# Patient Record
Sex: MALE | Race: WHITE | NOT HISPANIC OR LATINO | Employment: OTHER | URBAN - NONMETROPOLITAN AREA
[De-identification: names, ages, dates, MRNs, and addresses within clinical notes are randomized per-mention and may not be internally consistent; named-entity substitution may affect disease eponyms.]

---

## 2017-09-06 ENCOUNTER — OFFICE VISIT (OUTPATIENT)
Dept: SLEEP MEDICINE | Facility: HOSPITAL | Age: 62
End: 2017-09-06

## 2017-09-06 VITALS
SYSTOLIC BLOOD PRESSURE: 130 MMHG | DIASTOLIC BLOOD PRESSURE: 80 MMHG | BODY MASS INDEX: 35.79 KG/M2 | WEIGHT: 228 LBS | HEIGHT: 67 IN

## 2017-09-06 DIAGNOSIS — G25.81 RESTLESS LEGS SYNDROME (RLS): ICD-10-CM

## 2017-09-06 DIAGNOSIS — G47.33 OBSTRUCTIVE SLEEP APNEA, ADULT: Primary | ICD-10-CM

## 2017-09-06 DIAGNOSIS — F41.9 ANXIETY DISORDER, UNSPECIFIED TYPE: ICD-10-CM

## 2017-09-06 PROCEDURE — 99213 OFFICE O/P EST LOW 20 MIN: CPT | Performed by: INTERNAL MEDICINE

## 2017-09-06 NOTE — PROGRESS NOTES
Sleep Clinic Follow Up    Date: 9/6/2017  Primary Care Physician: Eugenia Naranjo DO      Interim History (1/3):  Since the last visit on 08/18/2016, patient has:      1)  LINDSEY - Has remained compliant with CPAP. He denies mask and machine issues, has occasional dry mouth, but denies headaches, pressures intolerance, or non-compliance. He denies abnormal dreams, sleep paralysis, hypnagogic hallucinations, or cataplexy symptoms.     PAP Data:  Time frame: 08/18/2016 - 09/05/2017   Compliance 96.6 %  PAP range : 8-15 cm H2O  Average 90% pressure: 8.5-14.5 cmH2O  Leak: 10 minutes   Average AHI 2.3 events/hr  Mask type: pillows  DME: [facility]    Bed time: 2100  Sleep latency: 5-20 minutes  Number of times awakens during the night: 0 but wife has tv on   Wake time: 0700  Estimated total sleep time at night: 10  Caffeine intake: 6 cups of coffee throughout the day  Alcohol intake: none  Nap time: 20 minutes at a time throughout the day  Sleepiness with Driving: no, but does little driving    2) RLS - on Requip 2mg nightly. He feels no symptoms but wife has complained of movements. Advised to skip 1 day per week    3) Depression and anxiety - stable on xanax, wellbutrin, and remeron    PMHx, FH, SH reviewed and pertinent changes are: unchanged from last office visit on 08/18/2016     REVIEW OF SYSTEMS:   Negative for chest pain, fever, chills, SOA, abdominal pain. Smoking: none      Exam (6-11/12):    Vitals:    09/06/17 0857   BP: 130/80     Body mass index is 35.71 kg/(m^2).    Gen:  No distress, conversant, pleasant, appears stated age, alert, oriented, flat affect  Eyes:   Anicteric sclera, moist conjunctiva, no lid lag     PERRLA, EOMI   Heent:   NC/AT    Oropharynx clear  Poor dentition, Mallampati 2 with narrow palatopharyngeal pillars    Normal hearing  Neck:   Supple, FROM    No thyromegaly    No carotid bruits  Lungs:  Normal effort, non-labored breathing    Clear to auscultation    Clear to percussion  CV:  Normal  S1/S2, without murmur    No lower extremity edema  ABD:  Soft normal bowel sounds    No enlarged liver or spleen    No hernias  Skin:  Normal tone, texture and turgor    No rashes or lesions  Psych:  Alert and oriented x3    Appropriate affect  Neuro:  CN 2-12 intact    No focal sensory deficits masses    Past Medical History:   Diagnosis Date   • Chronic kidney disease    • Colon polyp    • Diabetes mellitus    • Nervous disorder    • Stomach ulcer        Current Outpatient Prescriptions:   •  ALPRAZolam (XANAX) 0.5 MG tablet, Take 0.5 mg by mouth 3 (three) times a day., Disp: , Rfl:   •  amLODIPine (NORVASC) 10 MG tablet, Take 10 mg by mouth daily., Disp: , Rfl:   •  buPROPion XL (WELLBUTRIN XL) 300 MG 24 hr tablet, Take 300 mg by mouth daily., Disp: , Rfl:   •  losartan (COZAAR) 100 MG tablet, Take 100 mg by mouth daily., Disp: , Rfl:   •  metFORMIN (GLUCOPHAGE) 500 MG tablet, Take 500 mg by mouth 2 (two) times a day with meals., Disp: , Rfl:   •  mirtazapine (REMERON SOL-TAB) 45 MG disintegrating tablet, Take 45 mg by mouth every night., Disp: , Rfl:   •  pravastatin (PRAVACHOL) 40 MG tablet, Take 40 mg by mouth daily., Disp: , Rfl:   •  rOPINIRole (REQUIP) 2 MG tablet, Take 2 mg by mouth every night., Disp: , Rfl:   •  traMADol (ULTRAM) 50 MG tablet, Take 50 mg by mouth 3 (three) times a day., Disp: , Rfl:       ASSESSMENT:    1. Obstructive sleep apnea (PSG on 02/14/2015, AHI of 8.3, on CPAP of 8-15 cm H2O), currently on 8-15  2. Restless leg syndrome/ /Proctor-Ekbom disease (RLS/WED) - stable, take 1 day per week of medication holiday  3. Depression - stable   4. Caffeine excess - recommend reducing caffeine intake over time to no more than (3) caffeine beverages per day, all before 4pm.      PLAN:  1. Continue CPAP as prescribed.   2. Return to clinic in 1 year with compliance check unless sx change in the interim period.       This document has been electronically signed by Kimani Alvarado MD on September 6,  2017         CC: Eugenia Naranjo, DO          No ref. provider found

## 2017-11-09 DIAGNOSIS — G47.33 OSA (OBSTRUCTIVE SLEEP APNEA): Primary | ICD-10-CM

## 2018-09-10 ENCOUNTER — OFFICE VISIT (OUTPATIENT)
Dept: SLEEP MEDICINE | Facility: HOSPITAL | Age: 63
End: 2018-09-10

## 2018-09-10 VITALS
WEIGHT: 240 LBS | HEART RATE: 84 BPM | DIASTOLIC BLOOD PRESSURE: 82 MMHG | HEIGHT: 67 IN | OXYGEN SATURATION: 99 % | BODY MASS INDEX: 37.67 KG/M2 | SYSTOLIC BLOOD PRESSURE: 142 MMHG

## 2018-09-10 DIAGNOSIS — G47.33 OBSTRUCTIVE SLEEP APNEA, ADULT: Primary | ICD-10-CM

## 2018-09-10 DIAGNOSIS — G25.81 RESTLESS LEGS SYNDROME (RLS): ICD-10-CM

## 2018-09-10 DIAGNOSIS — F45.8 OTHER SOMATOFORM DISORDERS: ICD-10-CM

## 2018-09-10 PROCEDURE — 99214 OFFICE O/P EST MOD 30 MIN: CPT | Performed by: INTERNAL MEDICINE

## 2018-09-10 RX ORDER — ROPINIROLE 3 MG/1
3 TABLET, FILM COATED ORAL NIGHTLY
Qty: 30 TABLET | Refills: 6 | Status: SHIPPED | OUTPATIENT
Start: 2018-09-10 | End: 2019-07-24 | Stop reason: SDUPTHER

## 2018-09-10 NOTE — PROGRESS NOTES
Sleep Clinic Follow Up    Date: 9/10/2018  Primary Care Physician: Eugenia Naranjo DO      Interim History (1/3):  Since the last visit on 09/06/2017, patient has:      1)  LINDSEY - Has remained compliant with CPAP. He denies mask and machine issues, dry mouth, headaches, pressures intolerance, or non-compliance. He denies abnormal dreams, sleep paralysis, nasal congestion, URI sx.    PAP Data:    Time frame: 09/05/2017 - 09/09/2018   Compliance 98.9 %  Average use on days used: 6hrs 28 min  Percent of days with usage greater than or equal to 4 hours: 89.2%  PAP range : 8-15 cm H2O  Average 90% pressure: 8.6 cmH2O  Leak: 12 minutes  Average AHI 2.7 events/hr  Mask type: pillows  DME: [facility]    Bed time: 2330-5096  Sleep latency: 15 minutes  Number of times awakens during the night: 3-4  Wake time: 3761-6528  Estimated total sleep time at night: 6-10 hours  Caffeine intake: 0oz of coffee, 128oz of tea, and 0oz of soda  Alcohol intake: 0 drinks per week  Nap time: rare   Sleepiness with Driving: none    [place] - 11    2) RLS - on Requip 2mg nightly - per wife he continues all night.    3) Depression and anxiety - stable on wellbutrin, and remeron. Off Xanax      PMHx, FH, SH reviewed and pertinent changes are: off Xanax      REVIEW OF SYSTEMS:   Negative for chest pain, fever, chills, SOA, abdominal pain. Smoking: none      Exam (6-11/12):    Vitals:    09/10/18 1004   BP: 142/82   Pulse: 84   SpO2: 99%     Body mass index is 37.59 kg/m². Patient's Body mass index is 37.59 kg/m². BMI is above normal parameters. Recommendations include: referral to primary care.      Gen:  No distress, conversant, pleasant, appears stated age, alert, oriented  Eyes:   Anicteric sclera, moist conjunctiva, no lid lag     PERRLA, EOMI   Heent:   NC/AT    Oropharynx clear, Mallampati 4      normal hearing  Lungs:  Normal effort, non-labored breathing    Clear to auscultation    CV:  Normal S1/S2, no murmur    no lower extremity  edema  ABD:  Soft, normal bowel sounds    Psych:  Appropriate affect  Neuro:  CN 2-12 intact    Past Medical History:   Diagnosis Date   • Chronic kidney disease    • Colon polyp    • Diabetes mellitus (CMS/HCC)    • Nervous disorder    • Stomach ulcer        Current Outpatient Prescriptions:   •  ALPRAZolam (XANAX) 0.5 MG tablet, Take 0.5 mg by mouth 3 (three) times a day., Disp: , Rfl:   •  amLODIPine (NORVASC) 10 MG tablet, Take 10 mg by mouth daily., Disp: , Rfl:   •  buPROPion XL (WELLBUTRIN XL) 300 MG 24 hr tablet, Take 300 mg by mouth daily., Disp: , Rfl:   •  losartan (COZAAR) 100 MG tablet, Take 100 mg by mouth daily., Disp: , Rfl:   •  metFORMIN (GLUCOPHAGE) 500 MG tablet, Take 500 mg by mouth 2 (two) times a day with meals., Disp: , Rfl:   •  mirtazapine (REMERON SOL-TAB) 45 MG disintegrating tablet, Take 45 mg by mouth every night., Disp: , Rfl:   •  pravastatin (PRAVACHOL) 40 MG tablet, Take 40 mg by mouth daily., Disp: , Rfl:   •  rOPINIRole (REQUIP) 2 MG tablet, Take 2 mg by mouth every night., Disp: , Rfl:   •  traMADol (ULTRAM) 50 MG tablet, Take 50 mg by mouth 3 (three) times a day., Disp: , Rfl:     Sleep Testin. OSG on 2015, AHI of 8.3   2. Currently on 8-15 cm H2O    ASSESSMENT / PLAN:     1. Obstructive sleep apnea  1. Continue PAP as prescribed.   2. Script for PAP supplies  3. Return to clinic in 1 year with compliance check unless sx change in the interim period.  2. Restless leg syndrome/ /Proctor-Ekbom disease (RLS/WED)   1. Check ferritin, results by mail  2. Increase Requip to 3 mg qhs  3. On lortab in place of ultram  4. May be secondary to antidepressants  3. Depression - stable  4. Caffeine excess - recommend reducing caffeine intake over time to no more than (3) caffeine beverages per day, all before 4pm.  5. CKD - 3      Total time 25 min, more than half spent in face to face counseling and coordination of care.         This document has been electronically signed by  Kimani Alvarado MD on September 10, 2018         CC: Eugenia Naranjo, DO          No ref. provider found

## 2018-11-12 ENCOUNTER — TELEPHONE (OUTPATIENT)
Dept: SLEEP MEDICINE | Facility: HOSPITAL | Age: 63
End: 2018-11-12

## 2018-11-12 DIAGNOSIS — G47.33 OBSTRUCTIVE SLEEP APNEA, ADULT: Primary | ICD-10-CM

## 2018-11-12 NOTE — TELEPHONE ENCOUNTER
Spoke with pt let him know DR CAO  Ordered his pressure back to 5-15 and he said if that does not work he needs to make an appointment with c-pap educator

## 2019-07-25 RX ORDER — ROPINIROLE 3 MG/1
3 TABLET, FILM COATED ORAL NIGHTLY
Qty: 30 TABLET | Refills: 6 | Status: SHIPPED | OUTPATIENT
Start: 2019-07-25